# Patient Record
Sex: MALE | Race: WHITE | NOT HISPANIC OR LATINO | URBAN - METROPOLITAN AREA
[De-identification: names, ages, dates, MRNs, and addresses within clinical notes are randomized per-mention and may not be internally consistent; named-entity substitution may affect disease eponyms.]

---

## 2021-04-23 ENCOUNTER — OUTPATIENT (OUTPATIENT)
Dept: OUTPATIENT SERVICES | Facility: HOSPITAL | Age: 66
LOS: 1 days | End: 2021-04-23
Payer: MEDICARE

## 2021-04-23 ENCOUNTER — APPOINTMENT (OUTPATIENT)
Dept: CT IMAGING | Facility: CLINIC | Age: 66
End: 2021-04-23
Payer: MEDICARE

## 2021-04-23 DIAGNOSIS — R10.84 GENERALIZED ABDOMINAL PAIN: ICD-10-CM

## 2021-04-23 PROBLEM — Z00.00 ENCOUNTER FOR PREVENTIVE HEALTH EXAMINATION: Status: ACTIVE | Noted: 2021-04-23

## 2021-04-23 PROCEDURE — 82565 ASSAY OF CREATININE: CPT

## 2021-04-23 PROCEDURE — 74177 CT ABD & PELVIS W/CONTRAST: CPT

## 2021-04-23 PROCEDURE — 74177 CT ABD & PELVIS W/CONTRAST: CPT | Mod: 26,MH

## 2021-05-19 DIAGNOSIS — R07.89 OTHER CHEST PAIN: ICD-10-CM

## 2021-05-20 PROBLEM — Z00.00 ENCOUNTER FOR PREVENTIVE HEALTH EXAMINATION: Noted: 2021-05-20

## 2021-05-21 DIAGNOSIS — R07.89 OTHER CHEST PAIN: ICD-10-CM

## 2021-06-10 ENCOUNTER — APPOINTMENT (OUTPATIENT)
Dept: PSYCHIATRY | Facility: CLINIC | Age: 66
End: 2021-06-10
Payer: MEDICARE

## 2021-06-10 DIAGNOSIS — Z90.49 ACQUIRED ABSENCE OF OTHER SPECIFIED PARTS OF DIGESTIVE TRACT: ICD-10-CM

## 2021-06-10 DIAGNOSIS — F45.9 SOMATOFORM DISORDER, UNSPECIFIED: ICD-10-CM

## 2021-06-10 DIAGNOSIS — Z86.69 PERSONAL HISTORY OF OTHER DISEASES OF THE NERVOUS SYSTEM AND SENSE ORGANS: ICD-10-CM

## 2021-06-10 DIAGNOSIS — F41.0 PANIC DISORDER [EPISODIC PAROXYSMAL ANXIETY]: ICD-10-CM

## 2021-06-10 DIAGNOSIS — F41.9 ANXIETY DISORDER, UNSPECIFIED: ICD-10-CM

## 2021-06-10 DIAGNOSIS — R45.89 OTHER SYMPTOMS AND SIGNS INVOLVING EMOTIONAL STATE: ICD-10-CM

## 2021-06-10 DIAGNOSIS — Z95.0 PRESENCE OF CARDIAC PACEMAKER: ICD-10-CM

## 2021-06-10 DIAGNOSIS — Z95.5 PRESENCE OF CORONARY ANGIOPLASTY IMPLANT AND GRAFT: ICD-10-CM

## 2021-06-10 PROCEDURE — 99205 OFFICE O/P NEW HI 60 MIN: CPT

## 2021-06-15 RX ORDER — ESCITALOPRAM OXALATE 10 MG/1
10 TABLET ORAL
Qty: 30 | Refills: 0 | Status: ACTIVE | COMMUNITY
Start: 2021-06-15 | End: 1900-01-01

## 2021-06-15 RX ORDER — MIRTAZAPINE 30 MG/1
30 TABLET, FILM COATED ORAL
Qty: 30 | Refills: 0 | Status: DISCONTINUED | COMMUNITY
Start: 2021-06-10 | End: 2021-06-15

## 2021-06-16 ENCOUNTER — OUTPATIENT (OUTPATIENT)
Dept: OUTPATIENT SERVICES | Facility: HOSPITAL | Age: 66
LOS: 1 days | Discharge: HOME | End: 2021-06-16
Payer: MEDICARE

## 2021-06-16 ENCOUNTER — APPOINTMENT (OUTPATIENT)
Dept: GASTROENTEROLOGY | Facility: HOSPITAL | Age: 66
End: 2021-06-16

## 2021-06-16 VITALS
DIASTOLIC BLOOD PRESSURE: 66 MMHG | SYSTOLIC BLOOD PRESSURE: 109 MMHG | TEMPERATURE: 98 F | HEART RATE: 61 BPM | HEIGHT: 71 IN | WEIGHT: 216.93 LBS | OXYGEN SATURATION: 100 % | RESPIRATION RATE: 18 BRPM

## 2021-06-16 DIAGNOSIS — R07.89 OTHER CHEST PAIN: ICD-10-CM

## 2021-06-16 PROCEDURE — 91010 ESOPHAGUS MOTILITY STUDY: CPT | Mod: 26

## 2021-06-16 PROCEDURE — 91037 ESOPH IMPED FUNCTION TEST: CPT | Mod: 26

## 2021-06-16 NOTE — ASU PATIENT PROFILE, ADULT - ABILITY TO HEAR (WITH HEARING AID OR HEARING APPLIANCE IF NORMALLY USED):
Has hearing aides but doesn't wear them/Mildly to Moderately Impaired: difficulty hearing in some environments or speaker may need to increase volume or speak distinctly

## 2021-07-01 ENCOUNTER — APPOINTMENT (OUTPATIENT)
Dept: PSYCHIATRY | Facility: CLINIC | Age: 66
End: 2021-07-01

## 2022-06-01 ENCOUNTER — NON-APPOINTMENT (OUTPATIENT)
Age: 67
End: 2022-06-01

## 2023-05-23 PROBLEM — Z95.810 PRESENCE OF AUTOMATIC (IMPLANTABLE) CARDIAC DEFIBRILLATOR: Chronic | Status: ACTIVE | Noted: 2021-06-16

## 2023-06-05 ENCOUNTER — APPOINTMENT (OUTPATIENT)
Dept: OTOLARYNGOLOGY | Facility: CLINIC | Age: 68
End: 2023-06-05
Payer: MEDICARE

## 2023-06-05 VITALS — HEART RATE: 78 BPM | DIASTOLIC BLOOD PRESSURE: 80 MMHG | SYSTOLIC BLOOD PRESSURE: 118 MMHG

## 2023-06-05 DIAGNOSIS — R07.0 PAIN IN THROAT: ICD-10-CM

## 2023-06-05 PROCEDURE — 99204 OFFICE O/P NEW MOD 45 MIN: CPT | Mod: 25

## 2023-06-05 PROCEDURE — 31579 LARYNGOSCOPY TELESCOPIC: CPT

## 2023-06-05 RX ORDER — VALSARTAN 40 MG/1
TABLET ORAL
Refills: 0 | Status: ACTIVE | COMMUNITY

## 2023-06-05 RX ORDER — ATORVASTATIN CALCIUM 80 MG/1
TABLET, FILM COATED ORAL
Refills: 0 | Status: ACTIVE | COMMUNITY

## 2023-06-05 RX ORDER — TAMSULOSIN HYDROCHLORIDE 0.4 MG/1
CAPSULE ORAL
Refills: 0 | Status: ACTIVE | COMMUNITY

## 2023-06-05 RX ORDER — HYDRALAZINE HYDROCHLORIDE 50 MG/1
TABLET ORAL
Refills: 0 | Status: ACTIVE | COMMUNITY

## 2023-06-05 RX ORDER — DAPAGLIFLOZIN 10 MG/1
TABLET, FILM COATED ORAL
Refills: 0 | Status: ACTIVE | COMMUNITY

## 2023-06-05 RX ORDER — RABEPRAZOLE SODIUM 20 MG/1
TABLET, DELAYED RELEASE ORAL
Refills: 0 | Status: ACTIVE | COMMUNITY

## 2023-06-05 RX ORDER — METOPROLOL TARTRATE 75 MG/1
TABLET, FILM COATED ORAL
Refills: 0 | Status: ACTIVE | COMMUNITY

## 2023-06-05 RX ORDER — SUCRALFATE 1 G/1
TABLET ORAL
Refills: 0 | Status: ACTIVE | COMMUNITY

## 2023-06-05 RX ORDER — ELECTROLYTES/DEXTROSE
SOLUTION, ORAL ORAL
Refills: 0 | Status: ACTIVE | COMMUNITY

## 2023-06-05 RX ORDER — EPLERENONE 50 MG/1
TABLET, COATED ORAL
Refills: 0 | Status: ACTIVE | COMMUNITY

## 2023-06-05 RX ORDER — ISOSORBIDE DINITRATE 5 MG/1
TABLET ORAL
Refills: 0 | Status: ACTIVE | COMMUNITY

## 2023-06-05 RX ORDER — CLOPIDOGREL 75 MG/1
TABLET, FILM COATED ORAL
Refills: 0 | Status: ACTIVE | COMMUNITY

## 2023-06-05 RX ORDER — LEVOTHYROXINE SODIUM 0.17 MG/1
TABLET ORAL
Refills: 0 | Status: ACTIVE | COMMUNITY

## 2023-06-05 RX ORDER — ASPIRIN 81 MG/1
81 TABLET, CHEWABLE ORAL
Refills: 0 | Status: ACTIVE | COMMUNITY

## 2023-06-05 RX ORDER — ZOLPIDEM TARTRATE 5 MG/1
TABLET, FILM COATED ORAL
Refills: 0 | Status: ACTIVE | COMMUNITY

## 2023-06-05 RX ORDER — DICYCLOMINE HYDROCHLORIDE 20 MG/1
TABLET ORAL
Refills: 0 | Status: ACTIVE | COMMUNITY

## 2023-06-05 RX ORDER — FINASTERIDE 1 MG/1
TABLET ORAL
Refills: 0 | Status: ACTIVE | COMMUNITY

## 2023-06-05 NOTE — HISTORY OF PRESENT ILLNESS
[de-identified] : JOVI COPELAND  is a 68 year  year old man who presents to the [facility] with a chief complaint of throat pain and dysphonia. Is referred by Dr. Nair. Also complaining of dysphagia to solids, gradually getting worse over past 5 years.\par They have had pain in their throat for over 1 year. Can last for a few days to a couple hours.  Unclear what brings it on.  Feels likely really sore throat in the middle of throat.  Not taking any \par They have had a prior CT neck - unsure when\par They do have a prior smoking history - over 30 years ago\par They do not have a prior history of alcoholism/alcohol abuse.\par Throat pain is present when swallowing solids or liquids.\par They do not have a weight loss in the past 3 months.\par They have not altered their diet because of this pain.\par They do not have frequent ear pain.  \par Pain is absent when chewing. \par They have had a prior swallow study in the past 1 year.\par They have seen the following types of providers for this problem: ENT Dr. Nair \par They have taken the following medications for this problem: none\par Doing or taking the following makes the pain better: nothing \par Doing the following makes the pain worse: frequent talking, raising his voice\par Used to sing - no longer has a voice, very hoarse and cracks for the last 3 years\par Had a prior biopsy on thyroid years ago - unsure why he had this procedure

## 2023-06-05 NOTE — ASSESSMENT
[FreeTextEntry1] : Assessment/Plan:\par #1 Dysphagia\par #2 Throat pain- unk etiology\par #3 Muscle tension dysphonia\par \par After a thorough discussion of our findings today, the patient understands we need to do further workup to determine the potential cause of their dysphagia.  As such I have ordered them for a Modified Barium Swallow (MBS) to be performed by our SLP team.  I would like to follow up with them in clinic following this swallow study to go over the results and next steps.  The patient expressed understanding and agreement with this plan.\par \par We will also start with what Dr. Nair prescribed, Aciphex 40mg bid.  \par \par For his MTD I recommended voice therapy which he would like to defer for now.  \par \par He should continue to follow with his GI at [facility].  \par \par I would like to follow up with him in 1 month to see if PPI got rid f throat pain and to go over MBS.

## 2023-06-21 ENCOUNTER — OUTPATIENT (OUTPATIENT)
Dept: OUTPATIENT SERVICES | Facility: HOSPITAL | Age: 68
LOS: 1 days | Discharge: HOME | End: 2023-06-21

## 2023-06-21 ENCOUNTER — OUTPATIENT (OUTPATIENT)
Dept: OUTPATIENT SERVICES | Facility: HOSPITAL | Age: 68
LOS: 1 days | End: 2023-06-21

## 2023-06-21 ENCOUNTER — NON-APPOINTMENT (OUTPATIENT)
Age: 68
End: 2023-06-21

## 2023-06-21 ENCOUNTER — APPOINTMENT (OUTPATIENT)
Dept: SPEECH THERAPY | Facility: HOSPITAL | Age: 68
End: 2023-06-21

## 2023-06-21 ENCOUNTER — APPOINTMENT (OUTPATIENT)
Dept: RADIOLOGY | Facility: HOSPITAL | Age: 68
End: 2023-06-21
Payer: MEDICARE

## 2023-06-21 DIAGNOSIS — R13.12 DYSPHAGIA, OROPHARYNGEAL PHASE: ICD-10-CM

## 2023-06-21 PROCEDURE — 74230 X-RAY XM SWLNG FUNCJ C+: CPT | Mod: 26

## 2023-06-21 NOTE — ASSESSMENT
[FreeTextEntry1] : MODIFIED BARIUM SWALLOW STUDY \par \par Date of Report: 6/21/23 \par Date of Evaluation: 6/21/23 \par Patient Name: Panchito Parry \par YOB: 1955 \par Primary Diagnosis: OroPharyngeal Dysphagia \par Treatment Diagnosis: OroPharyngeal Dysphagia \par Referring Physician: Dr. Shaheed Agosto \par \par Impressions/Results:  \par 1. There was one episode of Trace Laryngeal Penetration during the swallow with retrieval above the level of the vocal folds and adequate airway protection maintained during consecutive sips of thin liquids. \par 2. No Aspiration observed before, during and after the swallow for puree or regular solids. \par \par Reason For Referral: This 68 year old male was seen for a Modified Barium Swallow to: rule out aspiration and assess for safest diet consistency, to determine patient's ability to safely tolerate an oral diet. The physician ordered this procedure because they want the patient to meet their nutrition/hydration needs by mouth without compromising respiratory status. \par \par Patient arrived today’s evaluation unaccompanied. Patient reporting "I’ve been losing my voice” and “sometimes foods and pills get stuck in my throat”. Patient states sometimes drinking water helps to relieve stuck sensation, other times it does not help. Symptoms initiated about a year and a half ago and has progressively gotten worse. Patient denies receiving the Heimlich and/or recent/past pneumonias.  \par \par Medical History: Patient presents with medical history of the following per EMR: \par Active Problems\par Anxiety (300.00) (F41.9)\par Atypical chest pain (786.59) (R07.89)\par Atypical chest pain (786.59) (R07.89)\par Dysphoric mood (301.3) (R45.89)\par Panic attacks (300.01) (F41.0)\par Somatic preoccupation (300.82) (F45.9)\par \par Past Medical History\par History of Coronary stent patent (V45.82) (Z95.5)\par History of cardiac pacemaker (V12.50,V45.01) (Z95.0)\par History of carpal tunnel syndrome (V12.49) (Z86.69)\par History of obstructive sleep apnea (327.23) (Z86.69)\par History of S/P cholecystectomy (V45.79) (Z90.49)\par \par Current Meds\par Aciphex TBEC\par Ambien TABS\par Dicyclomine HCl TABS\par Diovan TABS\par Eplerenone TABS\par EQ Aspirin Low Dose 81 MG Oral Tablet Chewable\par Escitalopram Oxalate 10 MG Oral Tablet; Take 1 tablet daily\par Farxiga TABS\par Finasteride TABS\par hydrALAZINE HCl TABS\par Isosorbide Dinitrate TABS\par Levothyroxine Sodium TABS\par Lipitor TABS\par Melatonin CAPS\par Metoprolol Tartrate TABS\par Plavix TABS\par Sucralfate TABS\par Tamsulosin HCl CAPS\par \par Allergies\par Penicillins\par  \par ASSESSMENT \par The patient was assessed standing in the lateral plane in the [facility]iology Suite, with Radiologist present. The patient was alert, cooperative. Secretion management was adequate. There was no coughing, throat clearing or wet/gurgly vocal quality prior to test administration. \par \par Consistencies Administered: \par Solids: Puree, Regular solids \par Liquids: Thin liquids \par \par SUMMARY & IMPRESSION \par The patient demonstrated the following: \par 1. Functional oral stage for puree, regular solids, and thin liquids marked by adequate oral acceptance, chewing for regular solids and tongue motion for anterior to posterior piecemeal (2 swallows for puree/regular solids) transport. Adequate oral clearance post primary swallow. \par 2. Mild pharyngeal dysphagia for puree, regular solids and thin liquids marked by an adequate pharyngeal swallow trigger with reduced base of tongue retraction, reduced hyolaryngeal elevation, reduced epiglottic deflection and reduced pharyngeal contractility. There is mild pharyngeal clearance deficits post primary swallow located primarily along the lateral pharyngeal wall (puree/solids>liquids). A cued hard/effortful swallow and a thin liquid wash benefits to lessen pharyngeal residue. There was one episode of Trace Laryngeal Penetration during the swallow with retrieval above the level of the vocal folds and adequate airway protection maintained during consecutive sips of thin liquids No Aspiration observed before, during and after the swallow for puree or regular solids.  \par  \par An Esophageal Screen was completed. The patient was turned to AP view and given a Barium Tablet with a cup of thin liquids. The tablet was noted to course through the esophagus without hold up. This is not a full/complete evaluation of the esophagus. \par \par Aspiration - Penetration Scale: \par PUREE: 1 \par REGULAR SOLIDS: 1 \par THIN LIQUIDS: 2 \par \par Aspiration - Penetration Scale (Jabari farfan al Dysphagia 11:93-98 (April 1996), Aspiration-Penetration Scale) \par 1. Material does not enter the airway \par 2. Material enters the airway, remains above the vocal folds, and is ejected from the airway \par 3. Material enters the airway, remains above the vocal folds, and is not ejected \par 4. Material enters the airway, contacts the vocal folds, and is ejected from the airway \par 5. Material enters the airway, contacts the vocal folds, and is not ejected from the airway \par 6. Material enters the airway, passes below the vocal folds and is ejected into the larynx or out of the airway \par 7. Material enters the airway, passes below the vocal folds, and is not ejected from the trachea despite effort \par 8. Material enters the airway, passes below the vocal folds, and no effort is made to eject \par \par Recommendations: \par 1) Regular solids with thin liquids \par 2) Feeding/swallowing guidelines: Upright positioning, alternate solids and liquids, Hard swallow per bite \par 3) Aspiration/Reflux precautions \par 4) Consider swallowing therapy at MD’s discretion to maximize swallow mechanism \par 5) Consider Voice Therapy/Evaluation at MD’s discretion given patient reporting loss of voice \par 6) Follow up with referring physician \par \par The above results and recommendations have been discussed with the patient. All questions were answered with patient demonstrating good understanding. \par \par Should you have any additional concerns, please contact the Center at [phone]. \par \par Enedelia Barnard MS, CCC-SLP \par Speech-Language Pathologist \par [facility]

## 2023-06-28 DIAGNOSIS — R13.13 DYSPHAGIA, PHARYNGEAL PHASE: ICD-10-CM

## 2023-07-03 ENCOUNTER — APPOINTMENT (OUTPATIENT)
Dept: OTOLARYNGOLOGY | Facility: CLINIC | Age: 68
End: 2023-07-03
Payer: MEDICARE

## 2023-07-03 VITALS
DIASTOLIC BLOOD PRESSURE: 74 MMHG | SYSTOLIC BLOOD PRESSURE: 120 MMHG | HEIGHT: 71 IN | WEIGHT: 215 LBS | BODY MASS INDEX: 30.1 KG/M2 | HEART RATE: 80 BPM

## 2023-07-03 DIAGNOSIS — K21.9 GASTRO-ESOPHAGEAL REFLUX DISEASE W/OUT ESOPHAGITIS: ICD-10-CM

## 2023-07-03 DIAGNOSIS — R26.89 OTHER ABNORMALITIES OF GAIT AND MOBILITY: ICD-10-CM

## 2023-07-03 DIAGNOSIS — R13.12 DYSPHAGIA, OROPHARYNGEAL PHASE: ICD-10-CM

## 2023-07-03 PROCEDURE — 99214 OFFICE O/P EST MOD 30 MIN: CPT

## 2023-07-03 NOTE — HISTORY OF PRESENT ILLNESS
[de-identified] : JOVI COPELAND is a 68 year year old male who presents to the [facility] for follow up of his dysphonia and dysphagia. Denies any throat pain anymore. I last saw the patient on 06/05/23. Voice remains about the same since last visit. Continues on Aciphex. Denies throat pain today. Also complaining of feeling off balance gradually getting worse over past year. \par \par Modified barium swallow performed on 6/21/23 and reviewed by myself shows:\par No CP bar or Zenker's. Incomplete pharyngeal squeeze. Moderate vallecular residue. Mild amount of pyriform sinus residue. Decrease BOT retraction and laryngeal elevation.  No esophageal screen apart from tablet.  No AP view apart from tablet. \par \par Previously reported: chief complaint of throat pain and dysphonia. Is referred by Dr. Nair. Also complaining of dysphagia to solids, gradually getting worse over past 5 years.\par They have had pain in their throat for over 1 year. Can last for a few days to a couple hours.  Unclear what brings it on.  Feels likely really sore throat in the middle of throat.  Not taking any \par They have had a prior CT neck - unsure when\par They do have a prior smoking history - over 30 years ago\par They do not have a prior history of alcoholism/alcohol abuse.\par Throat pain is present when swallowing solids or liquids.\par They do not have a weight loss in the past 3 months.\par They have not altered their diet because of this pain.\par They do not have frequent ear pain.  \par Pain is absent when chewing. \par They have had a prior swallow study in the past 1 year.\par They have seen the following types of providers for this problem: ENT Dr. Nair \par They have taken the following medications for this problem: none\par Doing or taking the following makes the pain better: nothing \par Doing the following makes the pain worse: frequent talking, raising his voice\par Used to sing - no longer has a voice, very hoarse and cracks for the last 3 years\par Had a prior biopsy on thyroid years ago - unsure why he had this procedure

## 2023-07-03 NOTE — ASSESSMENT
[FreeTextEntry1] : Assessment/Plan:\par #1 Dysphagia- appears neurogenic\par #3 Muscle tension dysphonia\par \par He will continue PPI as that has resolved throat apin. Defer to his GI for further management of that medication.  \par \par Separately have placed referral to neuro (Dr. Mao Luu) for his swallow weakness, imbalance and tremor with concern for underlying undiagnosed neuro disease and in that same though have ordered him for MRI.  He is to have results sent to me of MRI brain as it will be done [facility].  \par \par He continues to defer voice therapy.

## 2023-08-03 NOTE — HISTORY OF PRESENT ILLNESS
[de-identified] : JOVI COPELAND is a 68 year year old male who presents to the [facility] for follow up of his dysphonia and dysphagia (appears neurogenic). I last saw the patient on 7/3/23. At that time I recommended he continue PPI as that had resolved his throat pain. Separately I placed referral to neuro (Dr. Mao Luu) for his swallow weakness, imbalance, and tremor with concern for underlying undiagnosed neuro disease and in that same thought had ordered him for MRI. He was to have results sent to me of MRI brain as it will be done [facility]. He never scheduled neuro appointment with Dr. Luu.  Modified barium swallow performed on 6/21/23 and reviewed by myself shows: No CP bar or Zenker's. Incomplete pharyngeal squeeze. Moderate vallecular residue. Mild amount of pyriform sinus residue. Decrease BOT retraction and laryngeal elevation. No esophageal screen apart from tablet. No AP view apart from tablet.  Previously reported: chief complaint of throat pain and dysphonia. Is referred by Dr. Nair. Also complaining of dysphagia to solids, gradually getting worse over past 5 years. They have had pain in their throat for over 1 year. Can last for a few days to a couple hours. Unclear what brings it on. Feels likely really sore throat in the middle of throat. Not taking any They have had a prior CT neck - unsure when They do have a prior smoking history - over 30 years ago They do not have a prior history of alcoholism/alcohol abuse. Throat pain is present when swallowing solids or liquids. They do not have a weight loss in the past 3 months. They have not altered their diet because of this pain. They do not have frequent ear pain. Pain is absent when chewing. They have had a prior swallow study in the past 1 year. They have seen the following types of providers for this problem: ENT Dr. Nair They have taken the following medications for this problem: none Doing or taking the following makes the pain better: nothing Doing the following makes the pain worse: frequent talking, raising his voice Used to sing - no longer has a voice, very hoarse and cracks for the last 3 years Had a prior biopsy on thyroid years ago - unsure why he had this procedure

## 2023-08-07 ENCOUNTER — APPOINTMENT (OUTPATIENT)
Dept: OTOLARYNGOLOGY | Facility: CLINIC | Age: 68
End: 2023-08-07